# Patient Record
(demographics unavailable — no encounter records)

---

## 2024-10-17 NOTE — REASON FOR VISIT
[FreeTextEntry2] : VTE [FreeTextEntry1] : 10/17/2024 no CP or SOB on eliquis 2.5mg BID Still with right sided weakenss  eliquis 2.5mg BID   3/22  Since last visit patient denies C/P or SOB. Reports mild known R LE edema. No bleeding on Eliquis.  Medications: Eliquis 2.5 mg BID Tylenol ASA 81 mg daily Lipitor 80 mg daily Bisacodyl Losartan 50 mg daily Senna Zoloft Tobrdex   12/6/2023  Since discharge patient denies C/P or SOB. No LE pain or edema. Residual R sided weakness noted. Remains on Lovenox 40 mg daily.   Patient recently hospitalized 10/15 - 11/3  65 y/o M with PMHX HTN, HLD, history of CVA, presented with R sided weakness s/p STA-MCA bypass 10/25. LE venous duplex on 10/28 showed bilateral soleal DVT. Patient denies C/P or SOB. No noted LE edema on exam. VSS on RA. Vascular Cardiology consulted. Repeat LE venous duplex on 11/1 showed: The right and the left soleal veins remain thrombosed without proximal propagation. Patient was discharged on Lovenox 40 mg daily.

## 2024-10-17 NOTE — ASSESSMENT
[FreeTextEntry1] : Assessment:  1. Bilateral Soleal DVT 2. History of CVA s/p STA-MCA bypass 10/25.  Plan:  1. Continue Eliquis 2.5 mg BID. 2. Repeat LE venous duplex (not done since last visit). 3. Return in 6 months. 4. Continue excellent care with Dr. Mayorga.

## 2024-10-17 NOTE — REASON FOR VISIT
[FreeTextEntry2] : VTE [FreeTextEntry1] : 10/17/2024 no CP or SOB on eliquis 2.5mg BID Still with right sided weakenss  eliquis 2.5mg BID   3/22  Since last visit patient denies C/P or SOB. Reports mild known R LE edema. No bleeding on Eliquis.  Medications: Eliquis 2.5 mg BID Tylenol ASA 81 mg daily Lipitor 80 mg daily Bisacodyl Losartan 50 mg daily Senna Zoloft Tobrdex   12/6/2023  Since discharge patient denies C/P or SOB. No LE pain or edema. Residual R sided weakness noted. Remains on Lovenox 40 mg daily.   Patient recently hospitalized 10/15 - 11/3  63 y/o M with PMHX HTN, HLD, history of CVA, presented with R sided weakness s/p STA-MCA bypass 10/25. LE venous duplex on 10/28 showed bilateral soleal DVT. Patient denies C/P or SOB. No noted LE edema on exam. VSS on RA. Vascular Cardiology consulted. Repeat LE venous duplex on 11/1 showed: The right and the left soleal veins remain thrombosed without proximal propagation. Patient was discharged on Lovenox 40 mg daily.

## 2025-04-15 NOTE — REASON FOR VISIT
[Follow-Up - Clinic] : a clinic follow-up of [FreeTextEntry2] : VTE [FreeTextEntry1] : 4/17/2025  Since last visit our pleasant patient reports...  10/17/2024 no CP or SOB on eliquis 2.5mg BID Still with right sided weakenss  eliquis 2.5mg BID   3/22  Since last visit patient denies C/P or SOB. Reports mild known R LE edema. No bleeding on Eliquis.  Medications: Eliquis 2.5 mg BID Tylenol ASA 81 mg daily Lipitor 80 mg daily Bisacodyl Losartan 50 mg daily Senna Zoloft Tobrdex   12/6/2023  Since discharge patient denies C/P or SOB. No LE pain or edema. Residual R sided weakness noted. Remains on Lovenox 40 mg daily.   Patient recently hospitalized 10/15 - 11/3  63 y/o M with PMHX HTN, HLD, history of CVA, presented with R sided weakness s/p STA-MCA bypass 10/25. LE venous duplex on 10/28 showed bilateral soleal DVT. Patient denies C/P or SOB. No noted LE edema on exam. VSS on RA. Vascular Cardiology consulted. Repeat LE venous duplex on 11/1 showed: The right and the left soleal veins remain thrombosed without proximal propagation. Patient was discharged on Lovenox 40 mg daily.

## 2025-04-15 NOTE — REVIEW OF SYSTEMS
[Negative] : Heme/Lymph [FreeTextEntry5] : see HPI [FreeTextEntry9] : see HPI [de-identified] : see HPI

## 2025-04-15 NOTE — PHYSICAL EXAM
[Respiration, Rhythm And Depth] : normal respiratory rhythm and effort [Auscultation Breath Sounds / Voice Sounds] : lungs were clear to auscultation bilaterally [Heart Rate And Rhythm] : heart rate and rhythm were normal [Heart Sounds] : normal S1 and S2 [Bowel Sounds] : normal bowel sounds [Abdomen Soft] : soft [Abdomen Tenderness] : non-tender [Abnormal Walk] : normal gait [] : no ischemic changes [FreeTextEntry1] : Trace R LE edema bilaterally, R upper and lower extremity weakness, No LE edema [No Skin Ulcers] : no skin ulcer [Oriented To Time, Place, And Person] : oriented to person, place, and time

## 2025-04-17 NOTE — REASON FOR VISIT
[FreeTextEntry2] : VTE [FreeTextEntry1] : 4/17/2025 no CP or SOB on eliquis 2.5mg BID Still with right sided weakenss i dont see a venous duplex performed.  living in nursing home eliquis 2.5mg BID    10/17/2024 no CP or SOB on eliquis 2.5mg BID Still with right sided weakenss  eliquis 2.5mg BID   3/22  Since last visit patient denies C/P or SOB. Reports mild known R LE edema. No bleeding on Eliquis.  Medications: Eliquis 2.5 mg BID Tylenol ASA 81 mg daily Lipitor 80 mg daily Bisacodyl Losartan 50 mg daily Senna Zoloft Tobrdex   12/6/2023  Since discharge patient denies C/P or SOB. No LE pain or edema. Residual R sided weakness noted. Remains on Lovenox 40 mg daily.   Patient recently hospitalized 10/15 - 11/3  63 y/o M with PMHX HTN, HLD, history of CVA, presented with R sided weakness s/p STA-MCA bypass 10/25. LE venous duplex on 10/28 showed bilateral soleal DVT. Patient denies C/P or SOB. No noted LE edema on exam. VSS on RA. Vascular Cardiology consulted. Repeat LE venous duplex on 11/1 showed: The right and the left soleal veins remain thrombosed without proximal propagation. Patient was discharged on Lovenox 40 mg daily.

## 2025-04-17 NOTE — ASSESSMENT
[FreeTextEntry1] : Assessment: 1. Bilateral Soleal DVT 2. History of CVA s/p STA-MCA bypass 10/25.  Plan: 1. Continue Eliquis 2.5 mg BID. 2. Repeat LE venous duplex (not done since last visit). 3. Return in 6 months. 4. Continue excellent care with Dr. Mayorga. 5.  BP within reason  6.  Cont statin therapy  LD well controlled